# Patient Record
Sex: MALE | Race: WHITE | NOT HISPANIC OR LATINO | URBAN - METROPOLITAN AREA
[De-identification: names, ages, dates, MRNs, and addresses within clinical notes are randomized per-mention and may not be internally consistent; named-entity substitution may affect disease eponyms.]

---

## 2017-01-03 ENCOUNTER — OFFICE VISIT - HEALTHEAST (OUTPATIENT)
Dept: SURGERY | Facility: CLINIC | Age: 42
End: 2017-01-03

## 2017-01-03 DIAGNOSIS — G47.33 OSA ON CPAP: ICD-10-CM

## 2017-01-03 DIAGNOSIS — E78.1 HYPERTRIGLYCERIDEMIA: ICD-10-CM

## 2017-01-03 DIAGNOSIS — E66.01 OBESITY, CLASS III, BMI 40-49.9 (MORBID OBESITY) (H): ICD-10-CM

## 2017-01-03 DIAGNOSIS — E78.00 PURE HYPERCHOLESTEROLEMIA: ICD-10-CM

## 2017-01-03 DIAGNOSIS — I10 ESSENTIAL HYPERTENSION WITH GOAL BLOOD PRESSURE LESS THAN 130/80: ICD-10-CM

## 2017-01-03 ASSESSMENT — MIFFLIN-ST. JEOR: SCORE: 2081.27

## 2017-01-09 ENCOUNTER — AMBULATORY - HEALTHEAST (OUTPATIENT)
Dept: SURGERY | Facility: CLINIC | Age: 42
End: 2017-01-09

## 2017-03-20 ENCOUNTER — AMBULATORY - HEALTHEAST (OUTPATIENT)
Dept: SURGERY | Facility: CLINIC | Age: 42
End: 2017-03-20

## 2017-03-20 DIAGNOSIS — Z01.818 PRE-OP TESTING: ICD-10-CM

## 2017-04-19 ENCOUNTER — AMBULATORY - HEALTHEAST (OUTPATIENT)
Dept: SURGERY | Facility: CLINIC | Age: 42
End: 2017-04-19

## 2017-04-19 ENCOUNTER — COMMUNICATION - HEALTHEAST (OUTPATIENT)
Dept: SURGERY | Facility: CLINIC | Age: 42
End: 2017-04-19

## 2017-04-19 DIAGNOSIS — R63.4 RAPID WEIGHT LOSS: ICD-10-CM

## 2017-04-19 DIAGNOSIS — Z98.84 STATUS POST LAPAROSCOPIC SLEEVE GASTRECTOMY: ICD-10-CM

## 2017-04-20 ENCOUNTER — HOSPITAL ENCOUNTER (OUTPATIENT)
Dept: CARDIOLOGY | Facility: CLINIC | Age: 42
Discharge: HOME | End: 2017-04-20
Attending: EMERGENCY MEDICINE

## 2017-04-20 ENCOUNTER — HOSPITAL ENCOUNTER (OUTPATIENT)
Dept: RADIOLOGY | Facility: CLINIC | Age: 42
Discharge: HOME | End: 2017-04-20
Attending: EMERGENCY MEDICINE

## 2017-04-20 ENCOUNTER — AMBULATORY - HEALTHEAST (OUTPATIENT)
Dept: LAB | Facility: CLINIC | Age: 42
End: 2017-04-20

## 2017-04-20 ENCOUNTER — AMBULATORY - HEALTHEAST (OUTPATIENT)
Dept: SURGERY | Facility: CLINIC | Age: 42
End: 2017-04-20

## 2017-04-20 DIAGNOSIS — Z01.818 PRE-OP TESTING: ICD-10-CM

## 2017-04-20 DIAGNOSIS — Z71.3 DIETARY COUNSELING: ICD-10-CM

## 2017-04-20 DIAGNOSIS — Z98.84 BARIATRIC SURGERY STATUS: ICD-10-CM

## 2017-04-20 DIAGNOSIS — E66.01 OBESITY, MORBID, BMI 40.0-49.9 (H): ICD-10-CM

## 2017-04-20 LAB
ATRIAL RATE - MUSE: 102 BPM
DIASTOLIC BLOOD PRESSURE - MUSE: NORMAL MMHG
INTERPRETATION ECG - MUSE: NORMAL
P AXIS - MUSE: 34 DEGREES
PR INTERVAL - MUSE: 154 MS
QRS DURATION - MUSE: 88 MS
QT - MUSE: 344 MS
QTC - MUSE: 448 MS
R AXIS - MUSE: 53 DEGREES
SYSTOLIC BLOOD PRESSURE - MUSE: NORMAL MMHG
T AXIS - MUSE: 10 DEGREES
VENTRICULAR RATE- MUSE: 102 BPM

## 2017-04-20 ASSESSMENT — MIFFLIN-ST. JEOR: SCORE: 2072.2

## 2017-04-27 ENCOUNTER — COMMUNICATION - HEALTHEAST (OUTPATIENT)
Dept: SURGERY | Facility: CLINIC | Age: 42
End: 2017-04-27

## 2017-04-28 ENCOUNTER — COMMUNICATION - HEALTHEAST (OUTPATIENT)
Dept: SURGERY | Facility: CLINIC | Age: 42
End: 2017-04-28

## 2017-05-08 ASSESSMENT — MIFFLIN-ST. JEOR: SCORE: 2027.98

## 2017-05-09 ENCOUNTER — ANESTHESIA - HEALTHEAST (OUTPATIENT)
Dept: SURGERY | Facility: CLINIC | Age: 42
End: 2017-05-09

## 2017-05-10 ENCOUNTER — SURGERY - HEALTHEAST (OUTPATIENT)
Dept: SURGERY | Facility: CLINIC | Age: 42
End: 2017-05-10

## 2017-05-10 ASSESSMENT — MIFFLIN-ST. JEOR
SCORE: 1955.41
SCORE: 1963.12

## 2017-05-11 ASSESSMENT — MIFFLIN-ST. JEOR: SCORE: 1958.58

## 2017-05-12 ENCOUNTER — COMMUNICATION - HEALTHEAST (OUTPATIENT)
Dept: SURGERY | Facility: CLINIC | Age: 42
End: 2017-05-12

## 2017-05-16 ENCOUNTER — AMBULATORY - HEALTHEAST (OUTPATIENT)
Dept: SURGERY | Facility: CLINIC | Age: 42
End: 2017-05-16

## 2017-05-16 DIAGNOSIS — E66.9 OBESITY (BMI 30-39.9): ICD-10-CM

## 2017-05-16 DIAGNOSIS — Z71.3 DIETARY COUNSELING: ICD-10-CM

## 2017-05-16 DIAGNOSIS — Z98.84 BARIATRIC SURGERY STATUS: ICD-10-CM

## 2017-05-23 ENCOUNTER — OFFICE VISIT - HEALTHEAST (OUTPATIENT)
Dept: SURGERY | Facility: CLINIC | Age: 42
End: 2017-05-23

## 2017-05-23 DIAGNOSIS — Z98.84 S/P LAPAROSCOPIC SLEEVE GASTRECTOMY: ICD-10-CM

## 2017-05-23 ASSESSMENT — MIFFLIN-ST. JEOR: SCORE: 1900.98

## 2017-06-26 ENCOUNTER — COMMUNICATION - HEALTHEAST (OUTPATIENT)
Dept: SURGERY | Facility: CLINIC | Age: 42
End: 2017-06-26

## 2017-07-27 ENCOUNTER — COMMUNICATION - HEALTHEAST (OUTPATIENT)
Dept: SURGERY | Facility: CLINIC | Age: 42
End: 2017-07-27

## 2017-07-27 DIAGNOSIS — Z98.84 STATUS POST LAPAROSCOPIC SLEEVE GASTRECTOMY: ICD-10-CM

## 2017-08-15 ENCOUNTER — OFFICE VISIT - HEALTHEAST (OUTPATIENT)
Dept: SURGERY | Facility: CLINIC | Age: 42
End: 2017-08-15

## 2017-08-15 DIAGNOSIS — Z71.3 DIETARY COUNSELING: ICD-10-CM

## 2017-08-15 DIAGNOSIS — E66.9 OBESITY (BMI 30-39.9): ICD-10-CM

## 2017-08-15 DIAGNOSIS — Z98.84 BARIATRIC SURGERY STATUS: ICD-10-CM

## 2017-08-15 ASSESSMENT — MIFFLIN-ST. JEOR: SCORE: 1810.26

## 2017-09-25 ENCOUNTER — RECORDS - HEALTHEAST (OUTPATIENT)
Dept: LAB | Facility: CLINIC | Age: 42
End: 2017-09-25

## 2017-09-26 LAB
CHOLEST SERPL-MCNC: 145 MG/DL
FASTING STATUS PATIENT QL REPORTED: NO
HDLC SERPL-MCNC: 35 MG/DL
LDLC SERPL CALC-MCNC: 91 MG/DL
TRIGL SERPL-MCNC: 96 MG/DL

## 2017-11-20 ENCOUNTER — OFFICE VISIT - HEALTHEAST (OUTPATIENT)
Dept: SURGERY | Facility: CLINIC | Age: 42
End: 2017-11-20

## 2017-11-20 DIAGNOSIS — Z90.3 HISTORY OF SLEEVE GASTRECTOMY: ICD-10-CM

## 2017-11-20 DIAGNOSIS — K91.2 POSTOPERATIVE MALABSORPTION: ICD-10-CM

## 2017-11-20 DIAGNOSIS — G47.33 OSA ON CPAP: ICD-10-CM

## 2017-11-20 ASSESSMENT — MIFFLIN-ST. JEOR: SCORE: 1826.13

## 2018-02-12 ENCOUNTER — OFFICE VISIT - HEALTHEAST (OUTPATIENT)
Dept: SLEEP MEDICINE | Facility: CLINIC | Age: 43
End: 2018-02-12

## 2018-02-12 DIAGNOSIS — G47.33 OSA (OBSTRUCTIVE SLEEP APNEA): ICD-10-CM

## 2018-02-12 DIAGNOSIS — Z98.84 S/P LAPAROSCOPIC SLEEVE GASTRECTOMY: ICD-10-CM

## 2018-02-12 ASSESSMENT — MIFFLIN-ST. JEOR: SCORE: 1832.94

## 2018-02-26 ENCOUNTER — COMMUNICATION - HEALTHEAST (OUTPATIENT)
Dept: SURGERY | Facility: CLINIC | Age: 43
End: 2018-02-26

## 2018-03-19 ENCOUNTER — RECORDS - HEALTHEAST (OUTPATIENT)
Dept: SLEEP MEDICINE | Facility: CLINIC | Age: 43
End: 2018-03-19

## 2018-03-19 ENCOUNTER — RECORDS - HEALTHEAST (OUTPATIENT)
Dept: ADMINISTRATIVE | Facility: OTHER | Age: 43
End: 2018-03-19

## 2018-03-19 DIAGNOSIS — G47.33 OBSTRUCTIVE SLEEP APNEA (ADULT) (PEDIATRIC): ICD-10-CM

## 2018-03-19 DIAGNOSIS — Z98.84 BARIATRIC SURGERY STATUS: ICD-10-CM

## 2018-03-26 ENCOUNTER — COMMUNICATION - HEALTHEAST (OUTPATIENT)
Dept: SLEEP MEDICINE | Facility: CLINIC | Age: 43
End: 2018-03-26

## 2018-08-09 ENCOUNTER — COMMUNICATION - HEALTHEAST (OUTPATIENT)
Dept: SURGERY | Facility: CLINIC | Age: 43
End: 2018-08-09

## 2018-08-13 ENCOUNTER — COMMUNICATION - HEALTHEAST (OUTPATIENT)
Dept: SURGERY | Facility: CLINIC | Age: 43
End: 2018-08-13

## 2019-03-20 ENCOUNTER — RECORDS - HEALTHEAST (OUTPATIENT)
Dept: LAB | Facility: CLINIC | Age: 44
End: 2019-03-20

## 2019-03-20 LAB
ANION GAP SERPL CALCULATED.3IONS-SCNC: 9 MMOL/L (ref 5–18)
BUN SERPL-MCNC: 17 MG/DL (ref 8–22)
CALCIUM SERPL-MCNC: 9.9 MG/DL (ref 8.5–10.5)
CHLORIDE BLD-SCNC: 104 MMOL/L (ref 98–107)
CO2 SERPL-SCNC: 29 MMOL/L (ref 22–31)
CREAT SERPL-MCNC: 1.05 MG/DL (ref 0.7–1.3)
GFR SERPL CREATININE-BSD FRML MDRD: >60 ML/MIN/1.73M2
GLUCOSE BLD-MCNC: 148 MG/DL (ref 70–125)
POTASSIUM BLD-SCNC: 4.5 MMOL/L (ref 3.5–5)
SODIUM SERPL-SCNC: 142 MMOL/L (ref 136–145)

## 2019-08-23 ENCOUNTER — COMMUNICATION - HEALTHEAST (OUTPATIENT)
Dept: SURGERY | Facility: CLINIC | Age: 44
End: 2019-08-23

## 2021-05-12 ENCOUNTER — RECORDS - HEALTHEAST (OUTPATIENT)
Dept: LAB | Facility: CLINIC | Age: 46
End: 2021-05-12

## 2021-05-12 LAB
ALBUMIN SERPL-MCNC: 4.4 G/DL (ref 3.5–5)
ALP SERPL-CCNC: 88 U/L (ref 45–120)
ALT SERPL W P-5'-P-CCNC: 37 U/L (ref 0–45)
ANION GAP SERPL CALCULATED.3IONS-SCNC: 15 MMOL/L (ref 5–18)
AST SERPL W P-5'-P-CCNC: 28 U/L (ref 0–40)
BILIRUB SERPL-MCNC: 0.6 MG/DL (ref 0–1)
BUN SERPL-MCNC: 25 MG/DL (ref 8–22)
CALCIUM SERPL-MCNC: 9.3 MG/DL (ref 8.5–10.5)
CHLORIDE BLD-SCNC: 109 MMOL/L (ref 98–107)
CHOLEST SERPL-MCNC: 197 MG/DL
CO2 SERPL-SCNC: 19 MMOL/L (ref 22–31)
CREAT SERPL-MCNC: 1.08 MG/DL (ref 0.7–1.3)
ERYTHROCYTE [DISTWIDTH] IN BLOOD BY AUTOMATED COUNT: 13.6 % (ref 11–14.5)
FASTING STATUS PATIENT QL REPORTED: ABNORMAL
GFR SERPL CREATININE-BSD FRML MDRD: >60 ML/MIN/1.73M2
GLUCOSE BLD-MCNC: 109 MG/DL (ref 70–125)
HCT VFR BLD AUTO: 47.8 % (ref 40–54)
HDLC SERPL-MCNC: 35 MG/DL
HGB BLD-MCNC: 15.5 G/DL (ref 14–18)
IRON SATN MFR SERPL: 22 % (ref 20–50)
IRON SERPL-MCNC: 82 UG/DL (ref 42–175)
LDLC SERPL CALC-MCNC: 113 MG/DL
MCH RBC QN AUTO: 28.7 PG (ref 27–34)
MCHC RBC AUTO-ENTMCNC: 32.4 G/DL (ref 32–36)
MCV RBC AUTO: 89 FL (ref 80–100)
PLATELET # BLD AUTO: 311 THOU/UL (ref 140–440)
PMV BLD AUTO: 10.3 FL (ref 8.5–12.5)
POTASSIUM BLD-SCNC: 4.3 MMOL/L (ref 3.5–5)
PROT SERPL-MCNC: 8 G/DL (ref 6–8)
PTH-INTACT SERPL-MCNC: 73 PG/ML (ref 10–86)
RBC # BLD AUTO: 5.4 MILL/UL (ref 4.4–6.2)
SODIUM SERPL-SCNC: 143 MMOL/L (ref 136–145)
TIBC SERPL-MCNC: 377 UG/DL (ref 313–563)
TRANSFERRIN SERPL-MCNC: 301 MG/DL (ref 212–360)
TRIGL SERPL-MCNC: 244 MG/DL
VIT B12 SERPL-MCNC: 600 PG/ML (ref 213–816)
WBC: 10 THOU/UL (ref 4–11)

## 2021-05-13 LAB
25(OH)D3 SERPL-MCNC: 19.1 NG/ML (ref 30–80)
MAGNESIUM SERPL-MCNC: 2.1 MG/DL (ref 1.8–2.6)

## 2021-05-24 ENCOUNTER — RECORDS - HEALTHEAST (OUTPATIENT)
Dept: ADMINISTRATIVE | Facility: CLINIC | Age: 46
End: 2021-05-24

## 2021-05-26 ENCOUNTER — RECORDS - HEALTHEAST (OUTPATIENT)
Dept: ADMINISTRATIVE | Facility: CLINIC | Age: 46
End: 2021-05-26

## 2021-05-28 ENCOUNTER — RECORDS - HEALTHEAST (OUTPATIENT)
Dept: ADMINISTRATIVE | Facility: CLINIC | Age: 46
End: 2021-05-28

## 2021-05-30 VITALS — HEIGHT: 68 IN | BODY MASS INDEX: 40.62 KG/M2 | WEIGHT: 268 LBS

## 2021-05-30 VITALS — WEIGHT: 270 LBS | BODY MASS INDEX: 40.92 KG/M2 | HEIGHT: 68 IN

## 2021-05-31 VITALS — BODY MASS INDEX: 38.4 KG/M2 | HEIGHT: 67 IN | WEIGHT: 244.7 LBS

## 2021-05-31 VITALS — HEIGHT: 67 IN | BODY MASS INDEX: 36.41 KG/M2 | WEIGHT: 232 LBS

## 2021-05-31 VITALS — WEIGHT: 215.5 LBS | BODY MASS INDEX: 33.82 KG/M2 | HEIGHT: 67 IN

## 2021-05-31 VITALS — WEIGHT: 212 LBS | BODY MASS INDEX: 33.27 KG/M2 | HEIGHT: 67 IN

## 2021-06-01 VITALS — WEIGHT: 217 LBS | BODY MASS INDEX: 34.06 KG/M2 | HEIGHT: 67 IN

## 2021-06-08 NOTE — PROGRESS NOTES
HPI: Cheko Gandara is a 41 y.o. male here today for consideration of metabolic and bariatric surgery. He is referred by Dr. Teague.  He has struggled with obesity for most of his life.  He did have major abdominal surgery back in 2005 for what turned out to be perforated diverticulitis.  He did have peritonitis and underwent a sigmoid resection as well as an appendectomy.  He had an uneventful postoperative recovery from that.  However he has had weight issues for most of his life.  He has tried diet and exercise but without very much success.  He does have albinism and therefore suffers from vitamin D deficiency.  He has a history of obstructive sleep apnea for which he uses CPAP.  He also has a history of some type of pituitary tumor that he is on a dopamine agonist for.  This controls this well.  He has significant vision loss but states that he is able to drive with use of corrective lenses.  He also has low testosterone levels which are felt to be in part due to his obesity and should improve with weight loss.      Allergies:Other environmental allergy    Past Medical History   Diagnosis Date     Albinism      Asthma      resolved after childhood he reports     GERD (gastroesophageal reflux disease)      better with CPAP.     GI (gastrointestinal bleed)      hx of hemorrhoids.     H/O: pituitary tumor      on dopamine agonist therapy. improving.     Hypertension      Hypogonadism in male      Sleep apnea      uses CPAP     Vision impairment        Past Surgical History   Procedure Laterality Date     Appendectomy       Bowel obstruction       Small intestine surgery         CURRENT MEDS:  Current Outpatient Prescriptions   Medication Sig Dispense Refill     cabergoline (DOSTINEX) 0.5 mg tablet Take 0.5 tablets by mouth 2 (two) times a week. Mondays and Thursdays  11     cholecalciferol, vitamin D3, (VITAMIN D3) 2,000 unit Tab Take 1 tablet by mouth daily.       clomiPHENE (CLOMID) 50 mg tablet Take 50 mg by  "mouth daily.       lisinopril-hydrochlorothiazide (PRINZIDE,ZESTORETIC) 10-12.5 mg per tablet Take 1 tablet by mouth daily.       OMEGA-3/DHA/EPA/FISH OIL (FISH OIL-OMEGA-3 FATTY ACIDS) 300-1,000 mg capsule Take 2 g by mouth daily.       tadalafil (CIALIS) 5 MG tablet Take 5 mg by mouth daily as needed for erectile dysfunction.       zinc gluconate 50 mg tablet Take 50 mg by mouth daily.       No current facility-administered medications for this visit.          Family History   Problem Relation Age of Onset     Mental illness Mother         reports that he has never smoked. He has never used smokeless tobacco. He reports that he drinks alcohol. He reports that he does not use illicit drugs.    Review of Systems -  A 12 point ROS was reviewed and except for what is listed in the HPI above, all others are negative  PSYCHIATRIC: He has undergone a lifestyle assessment and has been deemed a good candidate for bariatric surgery by the psychologist.    Visit Vitals     /85 (Patient Site: Right Arm, Patient Position: Sitting, Cuff Size: Adult Large)     Pulse 90     Temp 98.2  F (36.8  C) (Oral)     Resp 18     Ht 5' 7.5\" (1.715 m)     Wt (!) 270 lb (122.5 kg)     SpO2 100%     BMI 41.66 kg/m2     Wt Readings from Last 3 Encounters:   01/03/17 (!) 270 lb (122.5 kg)   09/26/16 (!) 264 lb (119.7 kg)   08/22/16 (!) 263 lb (119.3 kg)     Body mass index is 41.66 kg/(m^2).    EXAM:  GENERAL: This is a well-developed 41 y.o. male who appears his stated age  HEAD & NECK: Grossly normal.  No palpable thyroid lesions  CARDIAC: RRR without murmur  CHEST/LUNG:  Clear to auscultation  ABDOMEN: Obese.  Nontender.  No hernias or masses appreciated.  LYMPHATIC:  No significant adenopathy appreciated.    EXTREMITIES: Grossly normal.  No evidence of chronic venous stasis.    NEUROLOGIC: Focally intact  INTEGUMENT: No open lesions or ulcers  PSYCHIATRIC: Normal affect. He has a good grasp on the nature of his obesity and the " treatment options.    LABS:  Lab Results   Component Value Date    WBC 6.3 06/08/2016    HGB 15.8 06/08/2016    HCT 48.8 06/08/2016    MCV 86 06/08/2016     06/08/2016     INR/Prothrombin Time      Lab Results   Component Value Date    HGBA1C 5.9 06/08/2016     Lab Results   Component Value Date    ALT 32 06/08/2016    AST 21 06/08/2016    ALKPHOS 102 06/08/2016    BILITOT 0.3 06/08/2016       Assessment/Plan: 41 y.o. male who is an excellent candidate for bariatric and metabolic surgery.  After a careful conversation with the patient it was decided that a laparoscopic sleeve gastrectomy would be his best option, especially given his vitamin D deficiency and his previous abdominal surgery.       I went over the surgery in detail with him.  I went over the nature of the operation and some of the potential consequences of the surgery.  I went over the expected hospital course and discussed laparoscopic versus open surgery, understanding that we will plan on doing this laparoscopically with the possibility of having to convert to an open operation.  I went over some of the risks and complications of the operation including, but not limited to, DVT, pulmonary emboli, pneumonia, postoperative bleeding, wound infection, staple line leak, intra-abdominal sepsis, and possible death.  I also went over some of the potential nutritional concerns such as vitamin B-12, iron, vitamin D, vitamin A, calcium and protein deficiencies.  I will also went over the need for lifelong nutritional surveillance.  The patient understands and wants to proceed with surgery.  We will submit for prior authorization.      Shamir Lackey MD  [facility] Department of Surgery

## 2021-06-08 NOTE — PROGRESS NOTES
I have submitted a prior authorization request on behalf of this [facility] for a LSG with Dr. Shamir Lackey.  Copy to Sota to scan to patients chart

## 2021-06-09 NOTE — PROGRESS NOTES
Orders placed for pre-op testing including labs, EKG and CXR.  Pt plans to have the testing done on 4/20/2017 at [facility] when he is there for pre-op teaching classes.    Landy Dupree RN, N  [facility]  P [phone]  F [phone]

## 2021-06-10 NOTE — PROGRESS NOTES
Patient attended group class to review pre-op instructions for upcoming surgery.  Discussed admission process and hospital course.  Pharmacy information packet given and explained. Patient was given exercises to work on post-op for maintaining muscle mass and strengthening that was created by Physical Therapy.  Bariatric quiz reviewed. Rationale for correct answers given and pt. verbalized understanding. Discharge instructions, information card and follow up appointments given and reviewed with pt at this time and patient verbalized understanding. He will schedule his H&P with Broderick  and do his  pre-op testing at [facility] on 4/20/2017. Naty Interiano, RN, CBN

## 2021-06-10 NOTE — ANESTHESIA PREPROCEDURE EVALUATION
Anesthesia Evaluation      Patient summary reviewed   No history of anesthetic complications     Airway   Mallampati: III  Neck ROM: full   Pulmonary - normal exam   (+) asthma  sleep apnea,                          Cardiovascular - normal exam  (+) hypertension, ,     ECG reviewed        Neuro/Psych      Endo/Other    (+) diabetes mellitus, obesity,      GI/Hepatic/Renal    (+) GERD,             Dental - normal exam                        Anesthesia Plan  Planned anesthetic: general endotracheal    ASA 3   Induction: intravenous   Anesthetic plan and risks discussed with: patient  Anesthesia plan special considerations: antiemetics,   Post-op plan: routine recovery

## 2021-06-10 NOTE — ANESTHESIA CARE TRANSFER NOTE
Last vitals:   Vitals:    05/10/17 0911   BP: (!) 186/84   Pulse: 90   Resp: 20   Temp: 36.6  C (97.8  F)   SpO2: 99%     Patient's level of consciousness is drowsy  Spontaneous respirations: yes  Maintains airway independently: yes  Dentition unchanged: yes  Oropharynx: oropharynx clear of all foreign objects    QCDR Measures:  ASA# 20 - Surgical Safety Checklist: ASA20A - Safety Checks Done  PQRS# 430 - Adult PONV Prevention: 4558F - Pt received => 2 anti-emetic agents (different classes) preop & intraop  ASA# 8 - Peds PONV Prevention: NA - Not pediatric patient, not GA or 2 or more risk factors NOT present  PQRS# 424 - Deisi-op Temp Management: 4559F - At least one body temp DOCUMENTED => 35.5C or 95.9F within required timeframe  PQRS# 426 - PACU Transfer Protocol: - Transfer of care checklist used  ASA# 14 - Acute Post-op Pain: ASA14B - Patient did NOT experience pain >= 7 out of 10    I completed my SBAR handoff to the receiving nurse per policy and procedure.

## 2021-06-10 NOTE — PROGRESS NOTES
"HPI: Pt is here for follow up of a laparoscopic sleeve gastrectomy. He is doing well. Taking po well. No vomiting. No fevers or chills. Ambulating without problems.     Current Outpatient Prescriptions   Medication Sig Dispense Refill     cabergoline (DOSTINEX) 0.5 mg tablet Take 0.25 mg by mouth 2 (two) times a week. 1/2 tab Mondays and Thursdays 11     clomiPHENE (CLOMID) 50 mg tablet Take 50 mg by mouth every other day.        cyanocobalamin, vitamin B-12, 1,000 mcg Subl Place 1,000 mcg under the tongue daily.       lisinopril (PRINIVIL,ZESTRIL) 10 MG tablet Take 10 mg by mouth daily.       omeprazole (PRILOSEC) 20 MG capsule Take 1 capsule (20 mg total) by mouth daily. Open capsule and sprinkle on food. 90 capsule 0     pediatric multivitamin (FLINTSTONES) Chew chewable tablet Chew 1 tablet 2 (two) times a day.       tadalafil (CIALIS) 5 MG tablet Take 5 mg by mouth daily as needed for erectile dysfunction.       [START ON 5/24/2017] ursodiol (ACTIGALL) 300 mg capsule Take 300 mg by mouth 2 (two) times a day.       calcium citrate-vitamin D3 250 mg calcium- 200 unit Tab Take 1 tablet by mouth 2 (two) times a day.       cholecalciferol, vitamin D3, 5,000 unit Tab Take 1 tablet by mouth daily.       No current facility-administered medications for this visit.          /75 (Patient Site: Left Arm, Patient Position: Sitting, Cuff Size: Adult Large)  Pulse 73  Temp 98.4  F (36.9  C) (Oral)   Resp 18  Ht 5' 7\" (1.702 m)  Wt (!) 232 lb (105.2 kg)  SpO2 100%  BMI 36.34 kg/m2  Wt Readings from Last 3 Encounters:   05/23/17 (!) 232 lb (105.2 kg)   05/11/17 (!) 244 lb 11.2 oz (111 kg)   04/20/17 (!) 268 lb (121.6 kg)     Body mass index is 36.34 kg/(m^2).    EXAM:  GENERAL:Appears well  ABDOMEN: Incisions healing well      Assessment/Plan: Pt s/p laparoscopic sleeve gastrectomy. Doing well. Diet and activity discussed. [facility].    Shamir Lackey MD  [facility] " Department of Surgery

## 2021-06-10 NOTE — PROGRESS NOTES
Time in: 1:00 /Time out: 2:00  .  Initial Weight: 267 lbs  Weight: 268 lb (121.6 kg)  Weight loss from initial: -1  % Weight loss: -0.37 %    Weight goal: At or below initial weight    Pt presents for nutrition education class for liquid diets. Educated pt on 2 week preop and 1 week post op liquid diets. Discussed appropriate liquids and demonstrated portions for each of the food groupings during each diet phase. Reviewed appropriate calories/protein/fluid goals during 2 week pre-op liquid diet. Educated on correct vitamins/minerals to take after surgery in correct dosage and frequency. Provided grocery list and sample menu plan for each diet stage, as well as unflavored protein powder samples.     Pt will begin 2 week preop liquid diet 4/26/2017, will do clear liquids the day before surgery. Pt is scheduled for LSG on 5/10/2017, and will then follow two weeks  post op liquid diet. Pt  will f/u with RD 1 week post op for further diet advancement.

## 2021-06-10 NOTE — PROGRESS NOTES
Time in: 10:00/Time out: 11:00      Pt presents for 1 week post op dietitian follow up. Reports tolerating full liquids well. Denies n/v, constipation/diarrhea, or significant pain. Taking MVI and SL B12 appropriately. Taking adequate fluids/day. Educated pt on pureed and soft to bariatric regular diets. Provided grocery list and sample meal plan for each diet stage.    Pt will begin pureed diet on 5/24/2017 and advance as tolerated to softs/bariatric regular on 6/14/2017. Instructed pt to begin 500 mg calcium citrate BID and 5000IU Vitamin D3 when starting the soft/regular bariatric diet phase. Pt to follow up with RD at 3 months post op.

## 2021-06-10 NOTE — ANESTHESIA POSTPROCEDURE EVALUATION
Patient: Cheko aGndara   LAPAROSCOPIC SLEEVE GASTRECTOMY  Anesthesia type: general    Patient location: PACU  Last vitals:   Vitals:    05/10/17 1312   BP: 169/79   Pulse: 77   Resp: 16   Temp: 36.7  C (98.1  F)   SpO2: 97%     Post vital signs: stable  Level of consciousness: awake and responds to simple questions  Post-anesthesia pain: pain controlled  Post-anesthesia nausea and vomiting: no  Pulmonary: unassisted, return to baseline  Cardiovascular: stable and blood pressure at baseline  Hydration: adequate  Anesthetic events: no    QCDR Measures:  ASA# 11 - Deisi-op Cardiac Arrest: ASA11B - Patient did NOT experience unanticipated cardiac arrest  ASA# 12 - Deisi-op Mortality Rate: ASA12B - Patient did NOT die  ASA# 13 - PACU Re-Intubation Rate: ASA13B - Patient did NOT require a new airway mgmt  ASA# 10 - Composite Anes Safety: ASA10A - No serious adverse event  ASA# 38 - New Corneal Injury: ASA38A - No new exposure keratitis or corneal abrasion in PACU    Additional Notes:

## 2021-06-12 NOTE — PROGRESS NOTES
"Post-op Surgical Weight Loss Diet Evaluation     Assessment:  Pt presents for 3 months post-op RD visit, s/p LSG on 5/10/2017 with Dr. Lackey. Today we reviewed current eating habits and level of physical activity, and instructed on the changes that are required for successful bariatric outcomes.    Patient Progress: Pt is losing weight and happy with surgery results; wife has concerns with his portion sizes  -pt states he feels like a different man already - down multiple pants and ring sizes     Pt's Initial Weight: 267 lbs  Weight: 212 lb (96.2 kg)  Weight loss from initial: 55  % Weight loss: 20.6 %    Body mass index is 33.2 kg/(m^2).     Concerns: Pt not taking CaCitrate, meal sizes larger than recommended and snacking 3 times/day with 3 meals/day- talked about physical vs mental hunger, slowing down meal times and portion sizes for 3-6months PO    Vitamins   Multi Vit with Iron: yes  Calcium Citrate: no  B12: yes  D3: yes    Do you experience hunger? Yes  Do you have \"dumping\" syndrome? No   Nausea: no  Vomiting: no  Diarrhea: no  Constipation: no  Hair loss:yes - feels thinner     Diet Recall/Time: wakes 6am   Breakfast: 7/730am -2 eggs and 2 sausage patties (20g)   Am Snack: cheese stick and beef stick (15g)   Lunch: 12/1230p - Chicken sometimes with salad (21g)   Pm snack:trail mix 1/4 cup  Dinner: after 5pm - Pro/CHO - trying to limit CHO (21g)   HS Snack: trail mix OR apple w/ 1tbsPB   -bedtime 10/11pm  Typical Snacks: protein shake instead of cheese/beef stick     Proteins/Veg/Fruits/CHO (NOT well tolerated): none    Estimated protein intake: 80 grams    Estimated portion size per meals: 1/2-3/4 cup/meal    Incorporation of vegetables, fruits, carbohydrates into diet/meals using   Bariatric \"Plate Method\"   The patient and I discussed the importance of including lean/low fat protein at each meal, including a vegetable/fruit, and limiting carbohydrate intake to less than 25% of plate volume. Always " "keeping within approved perimeters of post op meal portion sizes according to 3 months post op guidelines.    Healthy Fats:  Olive oil/canola oil    Meals per week away from home: fast food 1X/month; cafeteria food daily with sausage   Recommended limiting eating out to no more than 2x/week.    Meal Duration:15 minutes  Encouraged slowing meal times down, 20-30 minutes, chewing to applesauce consistency.     Fluid-meal separation:  Fluids are  30min before and 30 minutes after meals.  The patient and I reviewed the anatomy of the bypass and why  fluids from a meal is so important.    Fluid Intake  Water: 64oz  Caffeine: none  Alcohol: none  Carbonation: none  Milk: 1 glass/week  Juice: none    Discussed the importance of adequate hydration after surgery and the goal of 64+ oz of fluid daily.   The patient understands the importance of avoiding all carbonated, caffeinated, and sweetened drinks; and instead choosing 64oz plain water.    Exercise  Biking daily 20miles - 2 hours (4X/week)  Walking 12,000 steps/daily     Pt's understands that 30-60 minutes of daily activity is an important part of bariatric surgery success.   Encouraged pt to incorporate strength training exercise along with cardiovascular exercise as well, most days of the week.      PES statement:    1. (NC-1.4) Altered GI Function related to Alteration in gastrointestinal tract structure and/or function/ Decreased functional length of the GI tract as evidenced by Weight loss of 20.6% initial body weight; sleeve gastrectomy     Intervention    Discussion  1. Discussed 3 months  Post-Op Nutritional Guidelines for LSG  2. Recommended to consume 15-20gm protein at 3 meals daily, along with protein supplement/\"planned protein containing snack\" of 15-30gm protein, to reach goal of 60-80 gm protein daily.  3. Educated on post-op vitamin regimen: Multi Vit + iron 2x/day, calcium citrate 400-600 mg 2x/day, 2141-7242 mcg of Sublingual B-12 " "daily, and 5000 IU Vitamin D3 daily (MVI and calcium can be taken at the same time BID)  4. Reviewed lean protein sources  5. Bariatric Plate Method-  including lean/low fat protein at each meal, including a vegetable/fruit, and limiting carbohydrate intake to less than 25% of plate volume. Approved perimeters of post op meal portion sizes according to 3/6/9/12 months post op guidelines.  Instructions  1. Include 15-20gm protein at each meal, along with protein supplement/\"planned protein containing snack\" of 15-30gm protein, to reach goal of 60-80 gm protein daily.  2. Increase fluid intake to 64oz daily: choose plain or calorie/carbonation-free beverages.  3. Incorporate daily structured activity, 30-60 minutes most days of the week  4. Recommended pt to start taking: Multi Vit + iron 2x/day, calcium citrate 400-600 mg 2x/day, 0836-9465 mcg of Sublingual B-12 daily, and 5000 IU Vitamin D3 daily. (MVI and calcium can be taken at the same time)  5. Read food labels more consistently: keeping total fat grams <10, total sugar grams <10, fiber >3gm per serving.  6. Increase vegetable/fruit intake, by having a vegetable or fruit with each meal daily.  7. Practice plate method: 1/2 plate lean/low fat protein source, vegetable/fruit, <25% of plate complex carbohydrates.  8. Separate fluids 30 minutes before/after meal times.  9. Practice eating off of smaller plates/bowls, chewing to applesauce consistency, taking 20-30 minutes to eat in a calm/relaxed environment without distractions of tv/email/cell phone.    Handouts provided:  3 months  Post-Op Nutritional Guidelines for LSG  Monitor/Evaluation    Pt to follow up for 6 months  post-op visit with bariatrician     Time In: 1:30p  Time Out: 2:00p    ABN signed: Yes      "

## 2021-06-14 NOTE — PROGRESS NOTES
Bariatric Care Clinic Follow Up Visit for Previous Bariatric Surgery   Date of visit: 11/20/2017  Physician: Praneeth Borrero MD  Primary Care is Deven Lauren MD.  Cheko Gandara   42 y.o.  male    Date of Surgery: 5/101/7  Initial Weight: 267lbs  Initial BMI: 41.2  Today's Weight:   Wt Readings from Last 1 Encounters:   11/20/17 215 lb 8 oz (97.8 kg)     Body mass index is 33.75 kg/(m^2).  Weight: 215 lb 8 oz (97.8 kg)     Assessment and Plan   Assessment: Cheko is a 42 y.o. year old male who is 6 months s/p  Sleeve Gastrectomy with Dr. Lackey.  He has had a durable weight loss of 52 lbs since surgery.  Overall compliance with the [facility] Bariatric Surgery Program has been good.  .  Cheko Gandara feels that he has achieved his   preoperative goals for bariatric surgery.    Plan:  1. Great job so far, some of your previous hormone issues can contribute to being a little below average compared to someone without those problems. That said, optimizing your result in the next few months will depend on sticking to good bariatric technique, moving your body for 30-60minutes 3-4 days a week and using your vitamins properly.    2. Update labs with your primary care.  3. Follow up with sleep medicine to re-evaluate your sleep apnea anytime this Winter.  4. Limit alcohol exposure to rare usage.  5. HTN on lisinopril and managed by PCP.  preop blood pressures were routinely 160-180s systolic, bp today 140s.      1. Postoperative malabsorption  Comprehensive Metabolic Panel    Ferritin    Folate, Serum    Glycosylated Hemoglobin A1c    HM2(CBC w/o Differential)    Magnesium    Parathyroid Hormone Intact    Thiamin (Vitamin B1), Whole Blood    Vitamin A (Retinol), Serum or Plasma    Vitamin B12    Vitamin D, Total (25-Hydroxy)    Zinc, Serum or Plasma   2. History of sleeve gastrectomy  Comprehensive Metabolic Panel    Ferritin    Folate, Serum    Glycosylated Hemoglobin A1c    HM2(CBC w/o Differential)    Magnesium     Parathyroid Hormone Intact    Thiamin (Vitamin B1), Whole Blood    Vitamin A (Retinol), Serum or Plasma    Vitamin B12    Vitamin D, Total (25-Hydroxy)    Zinc, Serum or Plasma    Ambulatory referral to Sleep Medicine   3. CANDIE on CPAP  Ambulatory referral to Sleep Medicine       Return in about 6 months (around 5/20/2018), or dietician in 2 months.     Bariatric Surgery Review   Interim History/LifeChanges: stable    Patient Concerns: weight loss.    Medication changes: off Actigall (irregular use).     Vitamin Intake:   Multivitamin   centrum   Vitamin D  5000IUs   Calcium  none.   Vit. B-12    yes     Habits:            Alcohol Intake  rare hard beverage. Avoids carbonation.   NSAID Use  avoids   Caffeine Use  none   Exercise  biking in the summer, walks to the bus   CPAP Use:  yes   Birth Control  na             MBSAQIP  Surgeon: DYAN Lackey MD  Anticoag for PE/DVT since last visit: No  Patient complains of hernia: No  Readmit since last visit: No  Reoperation since last visit: No  Vomiting: Not at all  GERD req medication: No  Sleep Apnea: (!) Yes  Hypertension req meds: (!) Yes  # of Antihyperintensive Meds: 1  Hyperlipidemia req meds: No  Diabetes: No    Symptoms  Hair Loss: No (not anymore)  Reactive Hypoglycemia: No  Abdominal Pain: No  Nausea: No  Heartburn: No  Constipation: No  Diarrhea: No  Trouble Breathing or Chest Pain: No  Leg Swelling: No  Skin rashes under folds: No                         LABS: ordered      LABS:  Lab Results   Component Value Date    WBC 6.7 04/20/2017    HGB 17.1 04/20/2017    HCT 51.2 04/20/2017    MCV 87 04/20/2017     04/20/2017      Lab Results   Component Value Date    GBIUTFFG32HV 35.7 06/08/2016    Lab Results   Component Value Date    HGBA1C 5.9 06/08/2016      Lab Results   Component Value Date    CHOL 145 09/25/2017    Lab Results   Component Value Date    PTH 40 06/08/2016         Lab Results   Component Value Date    FERRITIN 126 06/08/2016      Lab Results    Component Value Date    HDL 35 (L) 09/25/2017      Lab Results   Component Value Date    PTJDSIQV86 840 (H) 06/08/2016    Lab Results   Component Value Date    36979 102 06/08/2016      Lab Results   Component Value Date    LDLCALC 91 09/25/2017    Lab Results   Component Value Date    TSH 1.59 06/08/2016    Lab Results   Component Value Date    FOLATE 11.1 06/08/2016      Lab Results   Component Value Date    TRIG 96 09/25/2017    Lab Results   Component Value Date    ALT 18 09/25/2017    AST 16 09/25/2017    ALKPHOS 81 09/25/2017    BILITOT 0.5 09/25/2017    No results found for: TESTOSTERONE     No components found for: CHOLHDL Lab Results   Component Value Date    7597 51.0 06/08/2016      @rubén(vitamin a: 1)@          Patient Profile   Social History     Social History Narrative        Past Medical History   Past Medical History:   Diagnosis Date     Albinism      Asthma     resolved after childhood he reports     Bowel obstruction     past hx     Diabetes      GERD (gastroesophageal reflux disease)     better with CPAP.     GI (gastrointestinal bleed)     hx of hemorrhoids.     H/O: pituitary tumor     on dopamine agonist therapy. improving.     Hypertension      Hypogonadism in male      Sleep apnea     uses CPAP     Vision impairment     legally blind     Vitamin D deficiency      Patient Active Problem List   Diagnosis     Morbid obesity due to excess calories     Hypercholesterolemia     Legally Blind ([place] Definition)     Hypertension     Hypertriglyceridemia     Low HDL (under 40)     Low serum vitamin D     CANDIE on CPAP     H/O: pituitary tumor     Low testosterone     S/P laparoscopic sleeve gastrectomy     Current Outpatient Prescriptions   Medication Sig     cabergoline (DOSTINEX) 0.5 mg tablet Take 0.25 mg by mouth 2 (two) times a week. 1/2 tab Mondays and Thursdays     cholecalciferol, vitamin D3, 5,000 unit Tab Take 1 tablet by mouth daily.     clomiPHENE (CLOMID) 50 mg tablet Take 50 mg by  "mouth every other day.      cyanocobalamin, vitamin B-12, 1,000 mcg Subl Place 1,000 mcg under the tongue daily.     lisinopril (PRINIVIL,ZESTRIL) 10 MG tablet Take 10 mg by mouth daily.     MULTIVITAMIN/IRON/FOLIC ACID (CENTRUM COMPLETE ORAL) Take 1 capsule by mouth daily.     tadalafil (CIALIS) 5 MG tablet Take 5 mg by mouth daily as needed for erectile dysfunction.       Past Surgical History  He has a past surgical history that includes Appendectomy; partial colectomy; Vasectomy; and pr lap, garrett restrict proc, longitudinal gastrectomy (N/A, 5/10/2017).     Examination   /84  Pulse 67  Temp 97.7  F (36.5  C)  Resp 16  Ht 5' 7\" (1.702 m)  Wt 215 lb 8 oz (97.8 kg)  BMI 33.75 kg/m2  Height: 5' 7\" (1.702 m) (11/20/2017  3:40 PM)  Initial Weight: 267 lbs (8/15/2017  1:00 PM)  Weight: 215 lb 8 oz (97.8 kg) (11/20/2017  3:40 PM)  Weight loss from initial: 55 (8/15/2017  1:00 PM)  % Weight loss: 20.6 % (8/15/2017  1:00 PM)  BMI (Calculated): 33.7 (11/20/2017  3:40 PM)  SpO2: 100 % (5/23/2017  8:49 AM)  Waist Circumference (In): 43 Inches (11/20/2017  3:40 PM)  Hip Circumference (In): 43.5 Inches (11/20/2017  3:40 PM)  Neck Circumference (In): 17.5 Inches (11/20/2017  3:40 PM)  Body fat percentage: 24.8 (11/20/2017  3:40 PM)  NSAIDS: No (11/20/2017  3:40 PM)  Pain Scale: 0 (11/20/2017  3:40 PM)  General:  Alert and ambulatory,   HEENT:  No conjunctival pallor, moist mucous Membranes, neck is thin. Smaller airway, mallampati 3. Baseline saccades.  Pulmonary:  Normal respiratory effort, no cough, no audible wheezes/crackles.  CV:  Regular rate and Rhythm, no murmurs, pulses 2 plus  Abdominal: rlq scar, midline and lap scars well healed.  Extremities: no tremor. No edema.  Skin:  Fair skin/complexion.   Pscyh/Mood: pleasant and pleased with results thus far. Some pressure from wife that he's eating too much/not doing as much as he could.          Counseling:   We reviewed the important post op bariatric " recommendations:  -eating 3 meals daily  -eating protein first, getting >60gm protein daily  -eating slowly, chewing food well  -avoiding/limiting calorie containing beverages  -drinking water 15-30 minutes before or after meals  -limiting restaurant or cafeteria eating to twice a week or less    We discussed the importance of restorative sleep and stress management in maintaining a healthy weight.  We discussed the National Weight Control Registry healthy weight maintenance strategies and ways to optimize metabolism.  We discussed the importance of physical activity including cardiovascular and strength training in maintaining a healthier weight.  We discussed the importance of life-long vitamin supplementation and life-long  follow-up.    Cheko was reminded that, to avoid marginal ulcers he should avoid tobacco at all, alcohol in excess, caffeine in excess, and NSAIDS (unless indicated for cardioprotection or othewise and opposed by a PPI).    At least 25 minutes was spent in direct consultation and over 50% of the time devoted to counseling regarding maximizing the benefits of his previous bariatric surgery while minimizing risks of nutritional or structural complications.    Praneeth Borrero MD  [facility].  11/20/2017  4:16 PM

## 2021-06-14 NOTE — PROGRESS NOTES
"Bariatric Care Clinic Follow Up Visit for Previous Bariatric Surgery   Date of visit: 11/20/2017  Physician: Praneeth Borrero MD  Primary Care is Deven Lauren MD.  Cheko Gandara   42 y.o.  male    Date of Surgery: 5/10/17  Initial Weight: 267 lbs.  Initial BMI: 41.2  Today's Weight:   Wt Readings from Last 1 Encounters:   11/20/17 215 lb 8 oz (97.8 kg)     Body mass index is 33.75 kg/(m^2).  Weight: 215 lb 8 oz (97.8 kg)     Assessment and Plan   Assessment: Cheko is a 42 y.o. year old male who is 6 months s/p  Sleeve Gastrectomy with Dr. Lackey.  He has had a durable weight loss of 52 lbs since surgery.  Overall compliance with the [facility] Bariatric Surgery Program has been good but portion sizes are larger than typical and exercise has tailed off in the last couple months with the weather change as his biking season came to an end.    He's working with his Urologist on his low testosterone and reports a \"normal\" level recently.   Vitamin usage is slightly lower than usual, will see what his labs look like. He prefers to have them done at Dr. Lauren's office so orders were printed off today.    His blood sugars are much improved since surgery and he thinks his sleep apnea may have resolved as his wife tells him he no longer snores. It's reasonable this Winter to have a sleep medicine evaluation to see if he still needs his CPAP, given his smaller airway it may still be a problem for him but perhaps his pressures could be reduced.     He's very happy with his results, calling it a \"game changer\".   .  Cheko Gandara feels that he has achieved his   preoperative goals for bariatric surgery.    Plan:  1. Great job so far, some of your previous hormone issues can contribute to being a little below average compared to someone without those problems. That said, optimizing your result in the next few months will depend on sticking to good bariatric technique, moving your body for 30-60minutes 3-4 days a week and " using your vitamins properly.    2. Update labs with your primary care.  3. Follow up with sleep medicine to re-evaluate your sleep apnea anytime this Winter.  4. Limit alcohol exposure to rare usage.      1. Postoperative malabsorption  Comprehensive Metabolic Panel    Ferritin    Folate, Serum    Glycosylated Hemoglobin A1c    HM2(CBC w/o Differential)    Magnesium    Parathyroid Hormone Intact    Thiamin (Vitamin B1), Whole Blood    Vitamin A (Retinol), Serum or Plasma    Vitamin B12    Vitamin D, Total (25-Hydroxy)    Zinc, Serum or Plasma   2. History of sleeve gastrectomy  Comprehensive Metabolic Panel    Ferritin    Folate, Serum    Glycosylated Hemoglobin A1c    HM2(CBC w/o Differential)    Magnesium    Parathyroid Hormone Intact    Thiamin (Vitamin B1), Whole Blood    Vitamin A (Retinol), Serum or Plasma    Vitamin B12    Vitamin D, Total (25-Hydroxy)    Zinc, Serum or Plasma    Ambulatory referral to Sleep Medicine   3. CANDIE on CPAP  Ambulatory referral to Sleep Medicine       Return in about 6 months (around 5/20/2018), or dietician in 2 months.

## 2021-06-15 PROBLEM — Z98.84 S/P LAPAROSCOPIC SLEEVE GASTRECTOMY: Status: ACTIVE | Noted: 2017-05-10

## 2021-06-16 NOTE — PROGRESS NOTES
Dear  Praneeth Borrero Md  [address]  [address]  [place] [address]    Thank you for the opportunity to participate in the care of  Cheko Gandara.    He is a 42 y.o. y/o who comes to the clinic for consultation.    Mr. Gandara was diagnosed with sleep apnea in 2012 at [facility]. He recalls that he was having extreme daytime sleepiness and snoring prior to his initial PSG. He was prescribed CPAP of 13 cwp after the test. Mr. Gandara used his CPAP device every night after his initial PSG but last year he had weight reduction surgery and he does not know if he still needs his CPAP device or not.      He stopped using his CPAP device 2 weeks ago (in preparation for this visit) and his wife reports that he is not snoring at night. He denies any daytime tiredness.      Current DME provider:[facility]  Current device:S9 Auto cpap   Current settings: P= 13 cwp    Current AHI: 0.2  Current compliance: 15/30    Past Medical History  Past Medical History:   Diagnosis Date     Albinism      Asthma     resolved after childhood he reports     Bowel obstruction     past hx     GERD (gastroesophageal reflux disease)     better with CPAP.     GI (gastrointestinal bleed)     hx of hemorrhoids.     H/O: pituitary tumor     on dopamine agonist therapy. improving.     Hypertension      Hypogonadism in male      Sleep apnea     uses CPAP     Vision impairment     legally blind     Vitamin D deficiency         Past Surgical History  Past Surgical History:   Procedure Laterality Date     APPENDECTOMY       partial colectomy      for diverticulitis     CT LAP, MILADYS RESTRICT PROC, LONGITUDINAL GASTRECTOMY N/A 5/10/2017    Procedure:  LAPAROSCOPIC SLEEVE GASTRECTOMY;  Surgeon: Shamir Lackey MD;  Location: [facility];  Service: General     VASECTOMY          Meds  Current Outpatient Prescriptions   Medication Sig Dispense Refill     cabergoline (DOSTINEX) 0.5 mg tablet Take 0.25 mg by mouth 2 (two) times a  week. 1/2 tab Mondays and Thursdays 11     cholecalciferol, vitamin D3, 5,000 unit Tab Take 1 tablet by mouth daily.       clomiPHENE (CLOMID) 50 mg tablet Take 50 mg by mouth every other day.        cyanocobalamin, vitamin B-12, 1,000 mcg Subl Place 1,000 mcg under the tongue daily.       lisinopril (PRINIVIL,ZESTRIL) 10 MG tablet Take 10 mg by mouth daily.       MULTIVITAMIN/IRON/FOLIC ACID (CENTRUM COMPLETE ORAL) Take 1 capsule by mouth daily.       tadalafil (CIALIS) 5 MG tablet Take 5 mg by mouth daily as needed for erectile dysfunction.       No current facility-administered medications for this visit.         Allergies  Other environmental allergy     Social History  Social History     Social History     Marital status:      Spouse name: N/A     Number of children: N/A     Years of education: N/A     Occupational History     Not on file.     Social History Main Topics     Smoking status: Never Smoker     Smokeless tobacco: Never Used     Alcohol use 0.0 - 1.2 oz/week     0 - 2 Cans of beer per week      Comment: couple times a month     Drug use: No     Sexual activity: Yes     Partners: Female     Other Topics Concern     Not on file     Social History Narrative        Family History  Family History   Problem Relation Age of Onset     Mental illness Mother      Heart disease Maternal Grandfather            Review of Systems:  Constitutional: Negative except as noted in HPI.   Eyes: Negative except as noted in HPI.   ENT: Negative except as noted in HPI.   Cardiovascular: Negative except as noted in HPI.   Respiratory: Negative except as noted in HPI.   Gastrointestinal: Negative except as noted in HPI.   Genitourinary: Negative except as noted in HPI.   Musculoskeletal: Negative except as noted in HPI.   Integumentary: Negative except as noted in HPI.   Neurological: Negative except as noted in HPI.   Psychiatric: Negative except as noted in HPI.   Endocrine: Negative except as noted in HPI.  "  Hematologic/Lymphatic: Negative except as noted in HPI.      Physical Exam:  Pulse 73  Ht 5' 7\" (1.702 m)  Wt 217 lb (98.4 kg)  SpO2 97%  BMI 33.99 kg/m2  BMI:Body mass index is 33.99 kg/(m^2).   GEN: NAD, obese  Head: Normocephalic.  ENT: Oropharynx is clear, Mallampatti class IV airway. Uvula is not edematous.  Nasal mucosa is pink.  Neck : Thyroid is not palpable.  Neurological: Alert, oriented to time, place, and person.  Psych:  normal mood, normal affect     Labs/Studies:     Lab Results   Component Value Date    WBC 6.7 04/20/2017    HGB 17.1 04/20/2017    HCT 51.2 04/20/2017    MCV 87 04/20/2017     04/20/2017         Chemistry        Component Value Date/Time     09/25/2017 1718    K 4.5 09/25/2017 1718     09/25/2017 1718    CO2 26 09/25/2017 1718    BUN 20 09/25/2017 1718    CREATININE 0.84 09/25/2017 1718     09/25/2017 1718        Component Value Date/Time    CALCIUM 9.8 09/25/2017 1718    ALKPHOS 81 09/25/2017 1718    AST 16 09/25/2017 1718    ALT 18 09/25/2017 1718    BILITOT 0.5 09/25/2017 1718            Lab Results   Component Value Date    FERRITIN 126 06/08/2016     Lab Results   Component Value Date    TSH 1.59 06/08/2016     Lab Results   Component Value Date    HGBA1C 5.9 06/08/2016         Assessment and Plan:  In summary Cheko Gandara is a 42 y.o. year old male here for consultation.    1. Obstructive sleep apnea.  Patient seems to have resolution of his OSAH symptoms.  I recommend completing an home sleep apnea test.  If he were to have clinically significant OSAH, he may need a lower pressure and I already changed his device to APAP mode with a P range from 4 cwp to 12 cwp.   If patient needs to use CPAP, it is important to avoid pet hair close to his device.     Patient verbalized understanding of these issues, agrees with the plan and all questions were answered today. Patient was given an opportuntity to voice any other symptoms or concerns not listed " above. Patient did not have any other symptoms or concerns.      MD BALTAZAR Perkins Board Certified in Internal Medicine and Sleep Medicine  [facility].

## 2021-06-21 ENCOUNTER — NURSE TRIAGE (OUTPATIENT)
Dept: NURSING | Facility: CLINIC | Age: 46
End: 2021-06-21

## 2021-06-21 NOTE — TELEPHONE ENCOUNTER
Triage call. Patient calling. Seen at [facility].    For the last month he has been bleeding out of his rectum.  Has seen blood twice today, both episodes bright red.  Having bleeding even without BM. Also reports seeing green stool today.  He had an episode of rectal bleeding while at work this morning and was concerned enough to go back home. He states he has an appointment tomorrow.  He reports history of low iron. States an iron supplement was prescribed but he hasn't started taking it yet.     Per protocol, advised to go to ED now. He states that is what he was told by his clinic but he was calling for a 2nd opinion.  Advised again that he should be seen at ED and if he does not want to follow that advise advised him to call his PCP at [facility].  Patient expressed concern regarding cost of ED visit and stated he might try to hold off until his appointment tomorrow.  Informed patient that the recommendation is that he be seen in ED.     Tiffanie Palacio RN 06/21/21 11:26 AM  [facility] Nurse Advisor      Reason for Disposition    MODERATE rectal bleeding (small blood clots, passing blood without stool, or toilet water turns red) more than once a day    Additional Information    Negative: Passed out (i.e., fainted, collapsed and was not responding)    Negative: Shock suspected (e.g., cold/pale/clammy skin, too weak to stand, low BP, rapid pulse)    Negative: Vomiting red blood or black (coffee ground) material    Negative: Sounds like a life-threatening emergency to the triager    Negative: SEVERE rectal bleeding (large blood clots; on and off, or constant bleeding)    Negative: SEVERE dizziness (e.g., unable to stand, requires support to walk, feels like passing out now)    Protocols used: RECTAL BLEEDING-A-OH    COVID 19 Nurse Triage Plan/Patient Instructions    Please be aware that novel coronavirus (COVID-19) may be circulating in the community. If you develop symptoms such as fever, cough, or SOB or if  you have concerns about the presence of another infection including coronavirus (COVID-19), please contact your health care provider or visit [email or web address].     Disposition/Instructions    ED Visit recommended. Follow protocol based instructions.     Bring Your Own Device:  Please also bring your smart device(s) (smart phones, tablets, laptops) and their charging cables for your personal use and to communicate with your care team during your visit.    Thank you for taking steps to prevent the spread of this virus.  o Limit your contact with others.  o Wear a simple mask to cover your cough.  o Wash your hands well and often.    Resources    [facility]: About COVID-19: [email or web address]    CDC: What to Do If You're Sick: www.cdc.gov/coronavirus/2019-ncov/about/steps-when-sick.html    CDC: Ending Home Isolation: www.cdc.gov/coronavirus/2019-ncov/hcp/disposition-in-home-patients.html     CDC: Caring for Someone: www.cdc.gov/coronavirus/2019-ncov/if-you-are-sick/care-for-someone.html     [facility]: Interim Guidance for Hospital Discharge to Home: www.health.[facility].[place]/diseases/coronavirus/hcp/hospdischarge.pdf    [facility] clinical trials (COVID-19 research studies): [email or web address]-trials     Below are the COVID-19 hotlines at the [place] [facility] ([facility]). Interpreters are available.   o For health questions: Call 294-889-7118 or 1-681.893.2292 (7 a.m. to 7 p.m.)  o For questions about schools and childcare: Call 143-638-0446 or 1-514.762.1507 (7 a.m. to 7 p.m.)

## 2021-08-07 ENCOUNTER — HEALTH MAINTENANCE LETTER (OUTPATIENT)
Age: 46
End: 2021-08-07

## 2021-09-07 ENCOUNTER — NURSE TRIAGE (OUTPATIENT)
Dept: NURSING | Facility: CLINIC | Age: 46
End: 2021-09-07

## 2021-09-07 NOTE — TELEPHONE ENCOUNTER
Patient calling - says he has been having some chest pain the last couple days.  No chest pain now.  He felt it 20 minutes ago.  It lasted 1 minute.  Says it went away.  Says this happens at times but has been getting worse.    Triaged to disposition of Go to ED Now.    Rachel Alatorre RN  Triage Nurse Advisor    COVID 19 Nurse Triage Plan/Patient Instructions    Please be aware that novel coronavirus (COVID-19) may be circulating in the community. If you develop symptoms such as fever, cough, or SOB or if you have concerns about the presence of another infection including coronavirus (COVID-19), please contact your health care provider or visit [email or web address].     Disposition/Instructions    ED Visit recommended. Follow protocol based instructions.     Bring Your Own Device:  Please also bring your smart device(s) (smart phones, tablets, laptops) and their charging cables for your personal use and to communicate with your care team during your visit.    Thank you for taking steps to prevent the spread of this virus.  o Limit your contact with others.  o Wear a simple mask to cover your cough.  o Wash your hands well and often.    Resources    [facility]: About COVID-19: [email or web address]    CDC: What to Do If You're Sick: www.cdc.gov/coronavirus/2019-ncov/about/steps-when-sick.html    CDC: Ending Home Isolation: www.cdc.gov/coronavirus/2019-ncov/hcp/disposition-in-home-patients.html     CDC: Caring for Someone: www.cdc.gov/coronavirus/2019-ncov/if-you-are-sick/care-for-someone.html     [facility]: Interim Guidance for Hospital Discharge to Home: www.health.Counts include 234 beds at the [facility].[place]/diseases/coronavirus/hcp/hospdischarge.pdf    [facility] clinical trials (COVID-19 research studies): [email or web address]-trials     Below are the COVID-19 hotlines at the [place] [employer] [facility] ([facility]). Interpreters are available.   o For health questions: Call 905-321-2377 or  "1-727.795.6795 (7 a.m. to 7 p.m.)  o For questions about schools and childcare: Call 333-749-4592 or 1-439.230.9184 (7 a.m. to 7 p.m.)   Reason for Disposition    [1] Intermittent  chest pain or \"angina\" AND [2] increasing in severity or frequency  (Exception: pains lasting a few seconds)    Additional Information    Negative: Severe difficulty breathing (e.g., struggling for each breath, speaks in single words)    Negative: Difficult to awaken or acting confused (e.g., disoriented, slurred speech)    Negative: Shock suspected (e.g., cold/pale/clammy skin, too weak to stand, low BP, rapid pulse)    Negative: [1] Chest pain lasts > 5 minutes AND [2] history of heart disease  (i.e., heart attack, bypass surgery, angina, angioplasty, CHF; not just a heart murmur)    Negative: [1] Chest pain lasts > 5 minutes AND [2] described as crushing, pressure-like, or heavy    Negative: [1] Chest pain lasts > 5 minutes AND [2] age > 50    Negative: [1] Chest pain lasts > 5 minutes AND [2] age > 30 AND [3] at least one cardiac risk factor (i.e., hypertension, diabetes, obesity, smoker or strong family history of heart disease)    Negative: [1] Chest pain lasts > 5 minutes AND [2] not relieved with nitroglycerin    Negative: Passed out (i.e., lost consciousness, collapsed and was not responding)    Negative: Heart beating < 50 beats per minute OR > 140 beats per minute    Negative: Visible sweat on face or sweat dripping down face    Negative: Sounds like a life-threatening emergency to the triager    Negative: Followed a chest injury    Protocols used: CHEST PAIN-A-AH      "

## 2021-10-02 ENCOUNTER — HEALTH MAINTENANCE LETTER (OUTPATIENT)
Age: 46
End: 2021-10-02

## 2022-09-03 ENCOUNTER — HEALTH MAINTENANCE LETTER (OUTPATIENT)
Age: 47
End: 2022-09-03

## 2023-09-30 ENCOUNTER — HEALTH MAINTENANCE LETTER (OUTPATIENT)
Age: 48
End: 2023-09-30

## 2024-12-13 ENCOUNTER — LAB REQUISITION (OUTPATIENT)
Dept: LAB | Facility: CLINIC | Age: 49
End: 2024-12-13
Payer: COMMERCIAL

## 2024-12-13 DIAGNOSIS — I10 ESSENTIAL (PRIMARY) HYPERTENSION: ICD-10-CM

## 2024-12-13 PROCEDURE — 80048 BASIC METABOLIC PNL TOTAL CA: CPT | Mod: ORL | Performed by: FAMILY MEDICINE

## 2024-12-14 LAB
ANION GAP SERPL CALCULATED.3IONS-SCNC: 12 MMOL/L (ref 7–15)
BUN SERPL-MCNC: 14.7 MG/DL (ref 6–20)
CALCIUM SERPL-MCNC: 9.4 MG/DL (ref 8.8–10.4)
CHLORIDE SERPL-SCNC: 103 MMOL/L (ref 98–107)
CREAT SERPL-MCNC: 0.88 MG/DL (ref 0.67–1.17)
EGFRCR SERPLBLD CKD-EPI 2021: >90 ML/MIN/1.73M2
GLUCOSE SERPL-MCNC: 106 MG/DL (ref 70–99)
HCO3 SERPL-SCNC: 25 MMOL/L (ref 22–29)
POTASSIUM SERPL-SCNC: 4.3 MMOL/L (ref 3.4–5.3)
SODIUM SERPL-SCNC: 140 MMOL/L (ref 135–145)

## 2025-03-30 ENCOUNTER — HEALTH MAINTENANCE LETTER (OUTPATIENT)
Age: 50
End: 2025-03-30